# Patient Record
Sex: MALE
[De-identification: names, ages, dates, MRNs, and addresses within clinical notes are randomized per-mention and may not be internally consistent; named-entity substitution may affect disease eponyms.]

---

## 2018-06-29 NOTE — RAD
ONE VIEW CHEST:

6/29/18

 

HISTORY: 

Nausea and vomiting. Dizziness. 

 

COMPARISON:  

3/29/11.

 

FINDINGS:  

One view chest:

 

Normal cardiac silhouette. The pulmonary vessels and hilum are normal. No masses or consolidations. N
o pneumothorax or osseous abnormalities.  

 

IMPRESSION:  

No acute cardiopulmonary process. 

 

POS: [facility]

## 2019-10-04 ENCOUNTER — HOSPITAL ENCOUNTER (EMERGENCY)
Dept: HOSPITAL 92 - ERS | Age: 39
Discharge: HOME | End: 2019-10-04
Payer: SELF-PAY

## 2019-10-04 DIAGNOSIS — E78.5: ICD-10-CM

## 2019-10-04 DIAGNOSIS — M10.9: Primary | ICD-10-CM

## 2019-10-04 DIAGNOSIS — Z79.899: ICD-10-CM

## 2019-10-04 DIAGNOSIS — E78.00: ICD-10-CM

## 2019-10-04 DIAGNOSIS — E03.9: ICD-10-CM

## 2019-10-04 PROCEDURE — 99283 EMERGENCY DEPT VISIT LOW MDM: CPT

## 2020-04-07 ENCOUNTER — HOSPITAL ENCOUNTER (EMERGENCY)
Dept: HOSPITAL 92 - ERS | Age: 40
Discharge: HOME | End: 2020-04-07
Payer: SELF-PAY
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
## 2020-04-07 DIAGNOSIS — E78.00: ICD-10-CM

## 2020-04-07 DIAGNOSIS — Z23: ICD-10-CM

## 2020-04-07 DIAGNOSIS — E03.9: ICD-10-CM

## 2020-04-07 DIAGNOSIS — E78.5: ICD-10-CM

## 2020-04-07 DIAGNOSIS — S01.81XA: Primary | ICD-10-CM

## 2020-04-07 DIAGNOSIS — Z79.899: ICD-10-CM

## 2020-04-07 PROCEDURE — 90715 TDAP VACCINE 7 YRS/> IM: CPT

## 2020-04-07 PROCEDURE — 12013 RPR F/E/E/N/L/M 2.6-5.0 CM: CPT

## 2020-04-07 PROCEDURE — 70450 CT HEAD/BRAIN W/O DYE: CPT

## 2020-04-07 PROCEDURE — 90471 IMMUNIZATION ADMIN: CPT
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
## 2021-10-10 ENCOUNTER — HOSPITAL ENCOUNTER (EMERGENCY)
Dept: HOSPITAL 92 - ERS | Age: 41
Discharge: LEFT BEFORE DISCHARGE | End: 2021-10-10
Payer: COMMERCIAL

## 2021-10-10 DIAGNOSIS — Z53.21: Primary | ICD-10-CM
